# Patient Record
Sex: FEMALE | Race: WHITE | NOT HISPANIC OR LATINO | Employment: FULL TIME | URBAN - METROPOLITAN AREA
[De-identification: names, ages, dates, MRNs, and addresses within clinical notes are randomized per-mention and may not be internally consistent; named-entity substitution may affect disease eponyms.]

---

## 2017-07-18 ENCOUNTER — OFFICE VISIT (OUTPATIENT)
Dept: OBSTETRICS AND GYNECOLOGY | Age: 32
End: 2017-07-18

## 2017-07-18 VITALS
WEIGHT: 213 LBS | BODY MASS INDEX: 34.23 KG/M2 | HEIGHT: 66 IN | SYSTOLIC BLOOD PRESSURE: 124 MMHG | DIASTOLIC BLOOD PRESSURE: 70 MMHG

## 2017-07-18 DIAGNOSIS — Z01.419 ENCOUNTER FOR GYNECOLOGICAL EXAMINATION: Primary | ICD-10-CM

## 2017-07-18 DIAGNOSIS — N92.6 IRREGULAR MENSES: ICD-10-CM

## 2017-07-18 DIAGNOSIS — D25.1 FIBROIDS, INTRAMURAL: ICD-10-CM

## 2017-07-18 PROCEDURE — 99395 PREV VISIT EST AGE 18-39: CPT | Performed by: OBSTETRICS & GYNECOLOGY

## 2017-07-18 NOTE — PROGRESS NOTES
"Subjective     Chief Complaint   Patient presents with   • Gynecologic Exam     Annual:discuss occasional irregular menses         History of Present Illness      Amanda Leonard is a very pleasant  32 y.o. female who presents for annual exam.  Mammo Exam n/a, Contraception n/a, Exercise 5 x wkly.   Overall patient is doing well her cycles have been very regular until this last one and she had a little irregular spotting associated with. No hirsutism no galactorrhea. She is not taking any herbs or supplements. She is restarted to exercise incorporating both resistance and cardiovascular training. She is known to have some fibroids and we have done an ultrasound those in the past. She has a new job and her stress level was actually decreased.      Obstetric History:  OB History     No data available         Menstrual History:     Patient's last menstrual period was 07/16/2017.       Sexual History:       Past Medical History:   Diagnosis Date   • Abdominal discomfort in right lower quadrant     Hx.   • Leiomyoma of uterus      No past surgical history on file.    SOCIAL Hx:      The following portions of the patient's history were reviewed and updated as appropriate: allergies, current medications, past family history, past medical history, past social history, past surgical history and problem list.    Review of Systems        Except as outlined in history of physical illness, patient denies any changes in her GYN, , GI systems.  All other systems reviewed were negative.         Objective   Physical Exam    /70  Ht 66\" (167.6 cm)  Wt 213 lb (96.6 kg)  LMP 07/16/2017  BMI 34.38 kg/m2    General: Patient is alert and oriented and appears overall healthy  Neck: Is supple without thyromegaly, no carotid bruits and no lymphadenopathy  Lungs: Clear bilaterally, no wheezing, rhonchi, or rales.  Respiratory rate is normal  Breast: Even, symmetrical, no lymphadenopathy, no retraction, no masses or cysts  Heart: " Regular rate and rhythm are appreciated, no murmurs or rubs are heard  Abdomen: Is soft, without organomegaly, bowel sounds are positive, there is no                                rebound or guarding and palpation does not produce any discomfort  Back: Nontender without CVA tenderness  Pelvic: External genitalia appear normal and consistent with mature female.  BUS normal                            Vagina is clean dry without discharge and appears adequately estrogenized, no               lesions or masses are present                         Cervix is noninflamed without discharge or lesions.  There is no cervical motion             tenderness.                Uterus is nonenlarged, without tenderness, and no masses or abnormalities are  present Geoffrey fibroids are nonpalpable               Adnexa are non-enlarged, non tender               Rectal exam reveals adequate sphincter tone and no masses or lesions are                     appreciated on digital rectal examination.    There is no problem list on file for this patient.                Assessment/Plan   Amanda was seen today for gynecologic exam.    Diagnoses and all orders for this visit:    Encounter for gynecological examination  -     IGP, Apt HPV,rfx 16 / 18,45  -     Ambulatory Referral to Infertility    Fibroids, intramural  -     Ambulatory Referral to Infertility         Nonpalpable no change since last examination  Irregular menses  -     Ambulatory Referral to Infertility    Given patient's age and no present partner she would like to have a consultation with one of the reproductive endocrinologist to discuss different options including freezing eggs and artificial insemination. We will get that consult going. She will call us back if that has not occurred in the next couple weeks      Annual Well Woman Exam    Discussed today's findings and concerns with patient in detail.  Continue to recommend regular exercise to include cardiovascular and  resistance training and breast self-exam. Wellness lab, mammography, and pap smear, in accordance with age guidelines.  Nutritional and caloric recommendations discussed.    All of the patient's questions were addressed and answered, I have encouraged her to call for today's test results if she has not received them within 10 days.  Patient is advised to call with any change in her condition or with any other questions, otherwise return in 12 months for annual examination.

## 2017-07-21 LAB
CYTOLOGIST CVX/VAG CYTO: NORMAL
CYTOLOGY CVX/VAG DOC THIN PREP: NORMAL
DX ICD CODE: NORMAL
HIV 1 & 2 AB SER-IMP: NORMAL
HPV I/H RISK 4 DNA CVX QL PROBE+SIG AMP: NEGATIVE
OTHER STN SPEC: NORMAL
PATH REPORT.FINAL DX SPEC: NORMAL
STAT OF ADQ CVX/VAG CYTO-IMP: NORMAL

## 2018-04-13 ENCOUNTER — TELEPHONE (OUTPATIENT)
Dept: OBSTETRICS AND GYNECOLOGY | Age: 33
End: 2018-04-13

## 2018-04-13 NOTE — TELEPHONE ENCOUNTER
Dr LAZAR pt, states her last cycle was irreg and she states she is having vaginal pain. Would like an US with this appt?     Pt # [phone]

## 2018-04-17 ENCOUNTER — OFFICE VISIT (OUTPATIENT)
Dept: OBSTETRICS AND GYNECOLOGY | Age: 33
End: 2018-04-17

## 2018-04-17 VITALS
WEIGHT: 204 LBS | DIASTOLIC BLOOD PRESSURE: 70 MMHG | SYSTOLIC BLOOD PRESSURE: 120 MMHG | BODY MASS INDEX: 32.78 KG/M2 | HEIGHT: 66 IN

## 2018-04-17 DIAGNOSIS — N92.6 IRREGULAR MENSES: Primary | ICD-10-CM

## 2018-04-17 DIAGNOSIS — R10.2 VAGINAL PAIN: ICD-10-CM

## 2018-04-17 LAB
B-HCG UR QL: NEGATIVE
INTERNAL NEGATIVE CONTROL: NEGATIVE
INTERNAL POSITIVE CONTROL: POSITIVE
Lab: NORMAL

## 2018-04-17 PROCEDURE — 99214 OFFICE O/P EST MOD 30 MIN: CPT | Performed by: NURSE PRACTITIONER

## 2018-04-17 PROCEDURE — 81025 URINE PREGNANCY TEST: CPT | Performed by: NURSE PRACTITIONER

## 2018-04-17 RX ORDER — FLUTICASONE PROPIONATE 50 MCG
SPRAY, SUSPENSION (ML) NASAL
COMMUNITY
Start: 2018-03-15 | End: 2019-08-12

## 2018-04-17 RX ORDER — CETIRIZINE HYDROCHLORIDE 10 MG/1
10 TABLET ORAL DAILY
COMMUNITY

## 2018-04-17 RX ORDER — SERTRALINE HYDROCHLORIDE 100 MG/1
TABLET, FILM COATED ORAL
Refills: 1 | COMMUNITY
Start: 2018-03-26

## 2018-04-17 NOTE — PROGRESS NOTES
"Subjective   Amanda Leonard is a 33 y.o. female is being seen today for   Chief Complaint   Patient presents with   • Menstrual Problem   • Vaginal Pain   .    History of Present Illness     Patient here to discuss her previous menstrual cycle.  It started 2-3 days earlier than usual and her cycles are usually \"like clockwork\".  It started very light and then lasted the same amount of time as usual but she has never had a cycle start that early.  She also complains of some vaginal pain since that cycle and states her cramping was worse with this cycle.  SHe reports a history of a \"small fibroid\".  She describes the vaginal pain as sharp, shooting pains that come and go throughout the day. It does not seem to be in association with anything.  She is not sexually active. She denies any other vaginal symptoms such as itching, burning or discharge. She hasn't noted any bladder changes.  She did notice some blood in her stool and saw her PCP for that yesterday.  They are just observing that for now.  She is trying to eat healthier but has had no health changes or concerns.        This is a new problem and she is a new patient to me.    The following portions of the patient's history were reviewed and updated as appropriate: allergies, current medications, past family history, past medical history, past social history, past surgical history and problem list.    /70   Ht 167.6 cm (66\")   Wt 92.5 kg (204 lb)   LMP 04/04/2018   BMI 32.93 kg/m²         Review of Systems   Constitutional: Negative.  Negative for activity change, appetite change, fatigue and fever.   HENT: Negative.    Eyes: Negative.    Respiratory: Negative.    Cardiovascular: Negative.    Gastrointestinal: Positive for blood in stool. Negative for abdominal distention, constipation, diarrhea and vomiting.   Endocrine: Negative.    Genitourinary: Positive for menstrual problem and vaginal pain. Negative for vaginal bleeding and vaginal discharge. " "  Musculoskeletal: Negative.    Skin: Negative.    Allergic/Immunologic: Negative.    Neurological: Negative.    Hematological: Negative.    Psychiatric/Behavioral: Negative.        Objective   Physical Exam   Constitutional: She is oriented to person, place, and time. She appears well-developed and well-nourished.   Abdominal: Soft. Bowel sounds are normal. She exhibits no distension and no mass. There is no tenderness. There is no rebound and no guarding.   Genitourinary: Vagina normal and uterus normal. Uterus is not tender. Cervix exhibits no motion tenderness, no discharge and no friability.   Genitourinary Comments: Thin white discharge noted   Neurological: She is alert and oriented to person, place, and time.   Skin: Skin is warm and dry.   Psychiatric: She has a normal mood and affect.         Assessment/Plan   Amanda was seen today for menstrual problem and vaginal pain.    Diagnoses and all orders for this visit:    Irregular menses  -     POC Pregnancy, Urine  -     Urine Culture - Urine, Urine, Clean Catch    Vaginal pain  -     NuSwab BV & Candida - Swab, Vagina        Her exam today is normal, the area of pain that she points to is her bilateral labial area.  There are no lesions, swelling or skin changes.  Her pelvic exam is also totally normal. We discussed her menses and that occasionally there can be an \"odd\" cycle for numerous reasons.  She should continue to monitor her periods and if they remain irregular or bothersome we can do a pelvic ultrasound for evaluation.  Ultimately she could also do some type of birth control to regulate them if they remain irregular.   I see no source of her vaginal pain and will send a vaginal culture and urine culture to rule out any infection.         "

## 2018-04-20 LAB
A VAGINAE DNA VAG QL NAA+PROBE: ABNORMAL SCORE
BACTERIA UR CULT: ABNORMAL
BACTERIA UR CULT: ABNORMAL
BVAB2 DNA VAG QL NAA+PROBE: ABNORMAL SCORE
C ALBICANS DNA VAG QL NAA+PROBE: POSITIVE
C GLABRATA DNA VAG QL NAA+PROBE: NEGATIVE
MEGA1 DNA VAG QL NAA+PROBE: ABNORMAL SCORE
OTHER ANTIBIOTIC SUSC ISLT: ABNORMAL

## 2018-04-24 ENCOUNTER — TELEPHONE (OUTPATIENT)
Dept: OBSTETRICS AND GYNECOLOGY | Age: 33
End: 2018-04-24

## 2018-04-24 RX ORDER — FLUCONAZOLE 150 MG/1
150 TABLET ORAL
Qty: 2 TABLET | Refills: 0 | Status: SHIPPED | OUTPATIENT
Start: 2018-04-24 | End: 2019-08-12

## 2018-04-24 RX ORDER — NITROFURANTOIN 25; 75 MG/1; MG/1
100 CAPSULE ORAL 2 TIMES DAILY
Qty: 14 CAPSULE | Refills: 0 | Status: SHIPPED | OUTPATIENT
Start: 2018-04-24 | End: 2018-05-01

## 2018-04-24 NOTE — TELEPHONE ENCOUNTER
----- Message from CHAGO Hernandez sent at 4/24/2018 10:27 AM EDT -----  Notify + UTI and yeast.  I sent in macrobid and diflucan for her to take.

## 2018-07-31 ENCOUNTER — OFFICE VISIT (OUTPATIENT)
Dept: OBSTETRICS AND GYNECOLOGY | Age: 33
End: 2018-07-31

## 2018-07-31 VITALS
HEIGHT: 65 IN | BODY MASS INDEX: 38.15 KG/M2 | SYSTOLIC BLOOD PRESSURE: 120 MMHG | WEIGHT: 229 LBS | DIASTOLIC BLOOD PRESSURE: 70 MMHG

## 2018-07-31 DIAGNOSIS — R63.8 INCREASED BMI: ICD-10-CM

## 2018-07-31 DIAGNOSIS — Z31.62 ENCOUNTER FOR FERTILITY PRESERVATION COUNSELING: ICD-10-CM

## 2018-07-31 DIAGNOSIS — N30.00 ACUTE CYSTITIS WITHOUT HEMATURIA: ICD-10-CM

## 2018-07-31 DIAGNOSIS — Z01.419 ENCOUNTER FOR GYNECOLOGICAL EXAMINATION: Primary | ICD-10-CM

## 2018-07-31 LAB
BILIRUB BLD-MCNC: NEGATIVE MG/DL
CLARITY, POC: CLEAR
COLOR UR: YELLOW
GLUCOSE UR STRIP-MCNC: NEGATIVE MG/DL
KETONES UR QL: NEGATIVE
LEUKOCYTE EST, POC: NEGATIVE
NITRITE UR-MCNC: NEGATIVE MG/ML
PH UR: 6 [PH] (ref 5–8)
PROT UR STRIP-MCNC: NEGATIVE MG/DL
RBC # UR STRIP: NEGATIVE /UL
SP GR UR: 1.01 (ref 1–1.03)
UROBILINOGEN UR QL: NORMAL

## 2018-07-31 PROCEDURE — 99395 PREV VISIT EST AGE 18-39: CPT | Performed by: OBSTETRICS & GYNECOLOGY

## 2018-07-31 PROCEDURE — 81002 URINALYSIS NONAUTO W/O SCOPE: CPT | Performed by: OBSTETRICS & GYNECOLOGY

## 2018-07-31 RX ORDER — TRIAMCINOLONE ACETONIDE 55 UG/1
2 SPRAY, METERED NASAL DAILY
COMMUNITY
End: 2021-09-09

## 2018-07-31 RX ORDER — CHLORAL HYDRATE 500 MG
CAPSULE ORAL
COMMUNITY
End: 2019-08-12

## 2018-07-31 NOTE — PROGRESS NOTES
"Subjective     Chief Complaint   Patient presents with   • Gynecologic Exam     Annual:last pap 2017,? uti,treated by PCP earlier this year         History of Present Illness      Amanda Leonard is a very pleasant  33 y.o. female who presents for annual exam.  Mammo Exam Age 38, Contraception none, Exercise occasional    Overall patient is doing well she is up-to-date on her wellness labs with PCP. She had a colonoscopy this year for some rectal bleeding that turned out to be associated with a polyp.    She has investigated future reproductive technologies and has declined freezing eggs might consider artificial insemination with donor sperm at a later time.      Obstetric History:  OB History      Para Term  AB Living    0 0 0 0 0 0    SAB TAB Ectopic Molar Multiple Live Births    0 0 0 0 0 0         Menstrual History:     Patient's last menstrual period was 2018 (approximate).       Sexual History:       Past Medical History:   Diagnosis Date   • Abdominal discomfort in right lower quadrant     Hx.   • History of recurrent UTIs    • Leiomyoma of uterus      Past Surgical History:   Procedure Laterality Date   • NO PAST SURGERIES         SOCIAL Hx:      The following portions of the patient's history were reviewed and updated as appropriate: allergies, current medications, past family history, past medical history, past social history, past surgical history and problem list.    Review of Systems        Except as outlined in history of physical illness, patient denies any changes in her GYN, , GI systems.  All other systems reviewed were negative.         Objective   Physical Exam    /70   Ht 165.1 cm (65\")   Wt 104 kg (229 lb)   LMP 2018 (Approximate)   BMI 38.11 kg/m²     General: Patient is alert and oriented and appears overall healthy  Neck: Is supple without thyromegaly, no carotid bruits and no lymphadenopathy  Lungs: Clear bilaterally, no wheezing, rhonchi, or rales.  " Respiratory rate is normal  Breast: Even, symmetrical, no lymphadenopathy, no retraction, no masses or cysts  Heart: Regular rate and rhythm are appreciated, no murmurs or rubs are heard  Abdomen: Is soft, without organomegaly, bowel sounds are positive, there is no                                rebound or guarding and palpation does not produce any discomfort  Back: Nontender without CVA tenderness  Pelvic: External genitalia appear normal and consistent with mature female.  BUS normal                            Vagina is clean dry without discharge and appears adequately estrogenized, no               lesions or masses are present                         Cervix is noninflamed without discharge or lesions.  There is no cervical motion             tenderness.                Uterus is nonenlarged, without tenderness, and no masses or abnormalities are  present, fibroid-nonpalpable               Adnexa are non-enlarged, non tender               Rectal exam reveals adequate sphincter tone and no masses or lesions are                     appreciated on digital rectal examination.    There is no problem list on file for this patient.                Assessment/Plan   Amanda was seen today for gynecologic exam.    Diagnoses and all orders for this visit:    Encounter for gynecological examination  -     IGP, Apt HPV,rfx 16 / 18,45  -     POC Urinalysis Dipstick    Acute cystitis without hematuria  -     POC Urinalysis Dipstick  Today's urinalysis negative  Increased BMI    Encounter for fertility preservation counseling  As outlined above patient might consider artificial insemination with donor sperm at a later stage. She is artery discussed things with several reproductive endocrinologist.      Annual Well Woman Exam    Discussed today's findings and concerns with patient in detail.  Continue to recommend regular exercise to include cardiovascular and resistance training and breast self-exam. Wellness lab, mammography,  and pap smear, in accordance with age guidelines.  Nutritional  recommendations  were discussed.    All of the patient's questions were addressed and answered, I have encouraged her to call for today's test results if she has not received them within 10 days.  Patient is advised to call with any change in her condition or with any other questions, otherwise return in 12 months for annual examination.

## 2018-08-28 ENCOUNTER — APPOINTMENT (OUTPATIENT)
Dept: WOMENS IMAGING | Facility: HOSPITAL | Age: 33
End: 2018-08-28

## 2018-08-28 ENCOUNTER — OFFICE VISIT (OUTPATIENT)
Dept: OBSTETRICS AND GYNECOLOGY | Age: 33
End: 2018-08-28

## 2018-08-28 VITALS
BODY MASS INDEX: 39.99 KG/M2 | WEIGHT: 240 LBS | DIASTOLIC BLOOD PRESSURE: 68 MMHG | SYSTOLIC BLOOD PRESSURE: 126 MMHG | HEIGHT: 65 IN

## 2018-08-28 DIAGNOSIS — N60.01 CYST OF RIGHT BREAST: ICD-10-CM

## 2018-08-28 DIAGNOSIS — R39.9 UTI SYMPTOMS: Primary | ICD-10-CM

## 2018-08-28 LAB
BILIRUB BLD-MCNC: NEGATIVE MG/DL
CLARITY, POC: CLEAR
COLOR UR: YELLOW
GLUCOSE UR STRIP-MCNC: NEGATIVE MG/DL
KETONES UR QL: NEGATIVE
LEUKOCYTE EST, POC: NEGATIVE
NITRITE UR-MCNC: NEGATIVE MG/ML
PH UR: 6.5 [PH] (ref 5–8)
PROT UR STRIP-MCNC: NEGATIVE MG/DL
RBC # UR STRIP: NEGATIVE /UL
SP GR UR: 1.01 (ref 1–1.03)
UROBILINOGEN UR QL: NORMAL

## 2018-08-28 PROCEDURE — 76641 ULTRASOUND BREAST COMPLETE: CPT | Performed by: RADIOLOGY

## 2018-08-28 PROCEDURE — 77066 DX MAMMO INCL CAD BI: CPT | Performed by: RADIOLOGY

## 2018-08-28 PROCEDURE — 81003 URINALYSIS AUTO W/O SCOPE: CPT | Performed by: PHYSICIAN ASSISTANT

## 2018-08-28 PROCEDURE — 99213 OFFICE O/P EST LOW 20 MIN: CPT | Performed by: PHYSICIAN ASSISTANT

## 2018-08-28 RX ORDER — MELATONIN
5000 DAILY
COMMUNITY

## 2018-08-28 RX ORDER — TRIAMCINOLONE ACETONIDE 55 UG/1
2 SPRAY, METERED NASAL DAILY
COMMUNITY
End: 2021-09-09

## 2018-08-28 NOTE — PROGRESS NOTES
"Subjective     Chief Complaint   Patient presents with   • Breast Problem     c/o right breast areola bump that is painful       Amanda Leonard is a 33 y.o.  whose LMP is Patient's last menstrual period was 2018 (exact date). presents with bump on areola  Right areola has a bump that is red and tender  No drainage, no fever  Present for 4 days  It has decreased in size and tenderness  No h/o this  Generally healthy  Just seen for her annual exam      No Additional Complaints Reported    The following portions of the patient's history were reviewed and updated as appropriate:vital signs, allergies, current medications, past family history, past medical history, past social history, past surgical history and problem list      Review of Systems   A comprehensive review of systems was negative except for: Integument/breast: positive for breast lump     Objective      /68   Ht 165.1 cm (65\")   Wt 109 kg (240 lb)   LMP 2018 (Exact Date)   Breastfeeding? No   BMI 39.94 kg/m²     Physical Exam    General:   alert, comfortable and no distress   Heart: Not performed today   Lungs: Not performed today.   Breast: left breast wnl, no masses or skin changes noted. right breast with mild erythema and induration noted on areola at hte 11 o'clock position. non tender and no drainage appreciated   Neck: na   Abdomen: {Not performed today   CVA: Not performed today   Pelvis: Not performed today   Extremities: Not performed today   Neurologic: negative   Psychiatric: Normal affect, judgement, and mood       Lab Review   Labs: No data reviewed     Imaging   No data reviewed    Assessment/Plan     ASSESSMENT  1. UTI symptoms    2. Cyst of right breast        PLAN  1.   Orders Placed This Encounter   Procedures   • Mammo Diagnostic Bilateral With CAD   • US breast right limited   • POC Urinalysis Dipstick, Multipro       2. Possible resolving abscess.  Plan imaging and warm compresses.  It is resolving so at " "this point antibiotics not indicated but discussed that if it should \"flare up\" that I would start one for her.  If imaging is wnl and redness and induration resolves will c/w monitoring only.  Pt agreeable with poc and all questions were answered    Follow up: 1 year(s)    DION Haywood  8/28/2018           "

## 2019-08-12 ENCOUNTER — OFFICE VISIT (OUTPATIENT)
Dept: OBSTETRICS AND GYNECOLOGY | Age: 34
End: 2019-08-12

## 2019-08-12 VITALS
WEIGHT: 257 LBS | HEIGHT: 65 IN | SYSTOLIC BLOOD PRESSURE: 120 MMHG | DIASTOLIC BLOOD PRESSURE: 78 MMHG | BODY MASS INDEX: 42.82 KG/M2

## 2019-08-12 DIAGNOSIS — Z01.419 ENCOUNTER FOR GYNECOLOGICAL EXAMINATION: Primary | ICD-10-CM

## 2019-08-12 PROCEDURE — 99395 PREV VISIT EST AGE 18-39: CPT | Performed by: OBSTETRICS & GYNECOLOGY

## 2019-08-12 RX ORDER — CELECOXIB 400 MG/1
CAPSULE ORAL
COMMUNITY
Start: 2019-07-10 | End: 2021-09-09

## 2019-08-12 RX ORDER — ATORVASTATIN CALCIUM 10 MG/1
TABLET, FILM COATED ORAL
COMMUNITY
Start: 2019-07-27

## 2019-08-12 RX ORDER — MONTELUKAST SODIUM 10 MG/1
10 TABLET ORAL DAILY
Refills: 5 | COMMUNITY
Start: 2019-05-16 | End: 2021-09-09

## 2019-08-12 NOTE — PROGRESS NOTES
"Subjective     Chief Complaint   Patient presents with   • Gynecologic Exam     Annual:last pap 2018,mammo 2018         History of Present Illness      Amanda Leonard is a very pleasant  34 y.o. female who presents for annual exam.  Mammo Exam 2018, Contraception none not sexually active, Exercise 4 times a week    Patient cycles have gotten a little more regular and a little less heavy.  She has no gynecological concerns or complaints.  We have previously talked about whether or not she wants to see a reproductive endocrinologist for potential artificial insemination with donor sperm.  She does not want to do that.  She is up-to-date on her wellness labs and had a mammogram last year.      Obstetric History:  OB History      Para Term  AB Living    0 0 0 0 0 0    SAB TAB Ectopic Molar Multiple Live Births    0 0 0 0 0 0         Menstrual History:     Patient's last menstrual period was 2019 (exact date).       Sexual History:       Past Medical History:   Diagnosis Date   • Abdominal discomfort in right lower quadrant     Hx.   • History of recurrent UTIs    • Leiomyoma of uterus      Past Surgical History:   Procedure Laterality Date   • NO PAST SURGERIES         SOCIAL Hx:      The following portions of the patient's history were reviewed and updated as appropriate: allergies, current medications, past family history, past medical history, past social history, past surgical history and problem list.    Review of Systems        Except as outlined in history of physical illness, patient denies any changes in her GYN, , GI systems.  All other systems reviewed were negative.         Objective   Physical Exam    /78   Ht 165.1 cm (65\")   Wt 117 kg (257 lb)   LMP 2019 (Exact Date)   BMI 42.77 kg/m²     General: Patient is alert and oriented and appears overall healthy  Neck: Is supple without thyromegaly, no carotid bruits and no lymphadenopathy  Lungs: Clear bilaterally, no " wheezing, rhonchi, or rales.  Respiratory rate is normal  Breast: Even, symmetrical, no lymphadenopathy, no retraction, no masses or cysts  Heart: Regular rate and rhythm are appreciated, no murmurs or rubs are heard  Abdomen: Is soft, without organomegaly, bowel sounds are positive, there is no                                rebound or guarding and palpation does not produce any discomfort  Back: Nontender without CVA tenderness  Pelvic: External genitalia appear normal and consistent with mature female.  BUS normal                            Vagina is clean dry without discharge and appears adequately estrogenized, no               lesions or masses are present                         Cervix is noninflamed without discharge or lesions.  There is no cervical motion             tenderness.                Uterus is nonenlarged, without tenderness, and no masses or abnormalities are  present, I was unable to appreciate any uterine leiomyoma               Adnexa are non-enlarged, non tender               Rectal exam reveals adequate sphincter tone and no masses or lesions are                     appreciated on digital rectal examination.      Annual Well Woman Exam  There is no problem list on file for this patient.                Assessment/Plan   Amanda was seen today for gynecologic exam.    Diagnoses and all orders for this visit:    Encounter for gynecological examination  -     IGP, Apt HPV,rfx 16 / 18,45    We will continue her Zoloft and that is being prescribed by her psychiatrist        Discussed today's findings and concerns with patient.  Continue to recommend regular exercise including cardiovascular and resistance training as well as  breast self-exam. Wellness lab, mammography, & pap smear, in accordance with age guidelines.        All of the patient's questions were addressed and answered, I have encouraged her to call for today's test results if she has not received them within 10 days.  Patient is  advised to call with any change in her condition or with any other questions, otherwise return in 12 months for annual examination.

## 2019-08-15 LAB
CYTOLOGIST CVX/VAG CYTO: NORMAL
CYTOLOGY CVX/VAG DOC CYTO: NORMAL
CYTOLOGY CVX/VAG DOC THIN PREP: NORMAL
DX ICD CODE: NORMAL
HIV 1 & 2 AB SER-IMP: NORMAL
HPV I/H RISK 4 DNA CVX QL PROBE+SIG AMP: NEGATIVE
Lab: NORMAL
OTHER STN SPEC: NORMAL
STAT OF ADQ CVX/VAG CYTO-IMP: NORMAL

## 2020-08-17 ENCOUNTER — OFFICE VISIT (OUTPATIENT)
Dept: OBSTETRICS AND GYNECOLOGY | Age: 35
End: 2020-08-17

## 2020-08-17 VITALS
WEIGHT: 238.6 LBS | DIASTOLIC BLOOD PRESSURE: 68 MMHG | SYSTOLIC BLOOD PRESSURE: 124 MMHG | BODY MASS INDEX: 39.75 KG/M2 | HEIGHT: 65 IN

## 2020-08-17 DIAGNOSIS — Z00.00 ENCOUNTER FOR ANNUAL PHYSICAL EXAM: ICD-10-CM

## 2020-08-17 DIAGNOSIS — Z01.419 ENCOUNTER FOR GYNECOLOGICAL EXAMINATION: ICD-10-CM

## 2020-08-17 DIAGNOSIS — Z31.69 PRE-CONCEPTION COUNSELING: Primary | ICD-10-CM

## 2020-08-17 PROCEDURE — 99395 PREV VISIT EST AGE 18-39: CPT | Performed by: OBSTETRICS & GYNECOLOGY

## 2020-08-17 NOTE — PROGRESS NOTES
"Subjective     Chief Complaint   Patient presents with   • Gynecologic Exam     AE today, Pap , Mg , No problems         History of Present Illness      Amanda Leonard is a very pleasant  35 y.o. female who presents for annual exam.  Mammo Exam , Contraception none not active, Exercise high intensity interval training    Patient has seen an infertility specialist and is considering moving forward on that equation    Patient has no other gynecological concerns or complaints.  She is known to have a uterine fibroid..      Obstetric History:  OB History        0    Para   0    Term   0       0    AB   0    Living   0       SAB   0    TAB   0    Ectopic   0    Molar   0    Multiple   0    Live Births   0               Menstrual History:     Patient's last menstrual period was 2020 (exact date).       Sexual History:       Past Medical History:   Diagnosis Date   • Abdominal discomfort in right lower quadrant     Hx.   • History of recurrent UTIs    • Leiomyoma of uterus      Past Surgical History:   Procedure Laterality Date   • NO PAST SURGERIES         SOCIAL Hx:      The following portions of the patient's history were reviewed and updated as appropriate: allergies, current medications, past family history, past medical history, past social history, past surgical history and problem list.    Review of Systems        Except as outlined in history of physical illness, patient denies any changes in her GYN, , GI systems.  All other systems reviewed were negative.         Objective   Physical Exam    /68   Ht 165.1 cm (65\")   Wt 108 kg (238 lb 9.6 oz)   LMP 2020 (Exact Date)   Breastfeeding No   BMI 39.71 kg/m²     General: Patient is alert and oriented and appears overall healthy  Neck: Is supple without thyromegaly, no carotid bruits and no lymphadenopathy  Lungs: Clear bilaterally, no wheezing, rhonchi, or rales.  Respiratory rate is normal  Breast: Even, symmetrical, " no lymphadenopathy, no retraction, no masses or cysts  Heart: Regular rate and rhythm are appreciated, no murmurs or rubs are heard  Abdomen: Is soft, without organomegaly, bowel sounds are positive, there is no                                rebound or guarding and palpation does not produce any discomfort  Back: Nontender without CVA tenderness  Pelvic: External genitalia appear normal and consistent with mature female.  BUS normal                            Vagina is clean dry without discharge and appears adequately estrogenized, no               lesions or masses are present                         Cervix is noninflamed without discharge or lesions.  There is no cervical motion             tenderness.                Uterus is nonenlarged, without tenderness, and no masses or abnormalities are  present               Adnexa are non-enlarged, non tender               Rectal exam reveals adequate sphincter tone and no masses or lesions are                     appreciated on digital rectal examination.      Annual Well Woman Exam  There is no problem list on file for this patient.                Assessment/Plan   Amanda was seen today for gynecologic exam.    Diagnoses and all orders for this visit:    Pre-conception counseling    Encounter for annual physical exam    Will follow back up with infertility specialist, in the meantime start taking daily doses of folic acid as we had discussed previously, if she were to get pregnant would recommend coming off her statin and Zoloft.      Discussed today's findings and concerns with patient.  Continue to recommend regular exercise including cardiovascular and resistance training as well as  breast self-exam. Wellness lab, mammography, & pap smear, in accordance with age guidelines.    I have encouraged her to call for today's test results if she has not received them within 10 days.  Patient is advised to call with any change in her condition or with any other questions,  otherwise return  for annual examination.

## 2020-08-19 LAB
CYTOLOGIST CVX/VAG CYTO: NORMAL
CYTOLOGY CVX/VAG DOC CYTO: NORMAL
CYTOLOGY CVX/VAG DOC THIN PREP: NORMAL
DX ICD CODE: NORMAL
HIV 1 & 2 AB SER-IMP: NORMAL
HPV I/H RISK 4 DNA CVX QL PROBE+SIG AMP: NEGATIVE
OTHER STN SPEC: NORMAL
STAT OF ADQ CVX/VAG CYTO-IMP: NORMAL

## 2020-10-13 ENCOUNTER — TELEPHONE (OUTPATIENT)
Dept: OBSTETRICS AND GYNECOLOGY | Age: 35
End: 2020-10-13

## 2020-10-13 RX ORDER — PNV NO.95/FERROUS FUM/FOLIC AC 28MG-0.8MG
1 TABLET ORAL DAILY
Qty: 30 TABLET | Refills: 11 | Status: SHIPPED | OUTPATIENT
Start: 2020-10-13

## 2020-10-13 NOTE — TELEPHONE ENCOUNTER
(Link Pt)       Pt called and wanting to know if a prenatal vitamin could be called into the pharmacy. Pt has looked at over the counter and was a little expensive.     Please advise     Pharmacy verified     [phone]

## 2020-10-29 ENCOUNTER — TELEPHONE (OUTPATIENT)
Dept: OBSTETRICS AND GYNECOLOGY | Age: 35
End: 2020-10-29

## 2020-10-29 NOTE — TELEPHONE ENCOUNTER
Patient called, stated that  referred her to a fertility specialist () patient would like know if there was any other doctors or practices that  would recommend?     Patient stated that she wasn't happy when she saw him, but if  feels as though he's the best option then she will continue to see .    Patient is aware that  is out of office today, and will respond at his earliest convenience.

## 2021-04-16 ENCOUNTER — BULK ORDERING (OUTPATIENT)
Dept: CASE MANAGEMENT | Facility: OTHER | Age: 36
End: 2021-04-16

## 2021-04-16 DIAGNOSIS — Z23 IMMUNIZATION DUE: ICD-10-CM

## 2021-09-09 ENCOUNTER — OFFICE VISIT (OUTPATIENT)
Dept: OBSTETRICS AND GYNECOLOGY | Age: 36
End: 2021-09-09

## 2021-09-09 VITALS
BODY MASS INDEX: 46.65 KG/M2 | SYSTOLIC BLOOD PRESSURE: 122 MMHG | HEIGHT: 65 IN | WEIGHT: 280 LBS | DIASTOLIC BLOOD PRESSURE: 72 MMHG

## 2021-09-09 DIAGNOSIS — Z12.4 ENCOUNTER FOR PAPANICOLAOU SMEAR FOR CERVICAL CANCER SCREENING: ICD-10-CM

## 2021-09-09 DIAGNOSIS — Z01.419 WELL WOMAN EXAM WITH ROUTINE GYNECOLOGICAL EXAM: Primary | ICD-10-CM

## 2021-09-09 DIAGNOSIS — Z11.51 SCREENING FOR HPV (HUMAN PAPILLOMAVIRUS): ICD-10-CM

## 2021-09-09 PROCEDURE — 99395 PREV VISIT EST AGE 18-39: CPT | Performed by: PHYSICIAN ASSISTANT

## 2021-09-09 RX ORDER — UBIDECARENONE 50 MG
CAPSULE ORAL DAILY
COMMUNITY

## 2021-09-09 NOTE — PROGRESS NOTES
"Subjective     Chief Complaint   Patient presents with   • Gynecologic Exam     annual exam last pap 2020 neg/ neg       History of Present Illness    Amanda Leonard is a 36 y.o.  who presents for annual exam.    She is doing ok  Staying healthy    She is attempting IVF with a sperm donor  [facility]    Is working on weight loss  Diagnosed with an eating disorder and trying to work on that with a therapist   Also on zoloft    Her menses are regular every 28-30 days, lasting 4-7 days, dysmenorrhea none   Obstetric History:  OB History        0    Para   0    Term   0       0    AB   0    Living   0       SAB   0    TAB   0    Ectopic   0    Molar   0    Multiple   0    Live Births   0               Menstrual History:     Patient's last menstrual period was 2021 (exact date).         Current contraception: none  History of abnormal Pap smear: no  Received Gardasil immunization: no  Perform regular self breast exam: yes - occl  Family history of uterine or ovarian cancer: no  Family History of colon cancer: no  Family history of breast cancer: no    Mammogram: not indicated.  Colonoscopy: not indicated.  DEXA: not indicated.    Exercise: not active  Calcium/Vitamin D: adequate intake    The following portions of the patient's history were reviewed and updated as appropriate: allergies, current medications, past family history, past medical history, past social history, past surgical history and problem list.    Review of Systems   All other systems reviewed and are negative.      Review of Systems   Constitutional: Negative for fatigue.   Respiratory: Negative for shortness of breath.    Gastrointestinal: Negative for abdominal pain.   Genitourinary: Negative for dysuria.   Neurological: Negative for headaches.   Psychiatric/Behavioral: Negative for dysphoric mood.         Objective   Physical Exam    /72   Ht 165.1 cm (65\")   Wt 127 kg (280 lb)   LMP 2021 (Exact " Date)   Breastfeeding No   BMI 46.59 kg/m²   General:   alert, comfortable and no distress   Heart: regular rate and rhythm   Lungs: clear to auscultation bilaterally   Breast: normal appearance, no masses or tenderness, Inspection negative, No nipple retraction or dimpling, No nipple discharge or bleeding, No axillary or supraclavicular adenopathy, Normal to palpation without dominant masses   Neck: no adenopathy and no carotid bruit   Abdomen: {normal findings: soft, non-tender   CVA: Not performed today   Pelvis: External genitalia: normal general appearance  Vaginal: normal mucosa without prolapse or lesions  Cervix: normal appearance and thin prep PAP obtained  Adnexa: normal bimanual exam  Uterus: normal single, nontender  Exam limited by body habitus   Extremities: Not performed today   Neurologic: negative   Psychiatric: Normal affect, judgement, and mood     Assessment/Plan   Diagnoses and all orders for this visit:    1. Well woman exam with routine gynecological exam (Primary)    2. Encounter for Papanicolaou smear for cervical cancer screening  -     IGP, Aptima HPV, Rfx 16 / 18,45    3. Screening for HPV (human papillomavirus)  -     IGP, Aptima HPV, Rfx 16 / 18,45        All questions answered.  Breast self exam technique reviewed and patient encouraged to perform self-exam monthly.  Discussed healthy lifestyle modifications.  Recommended 30 minutes of aerobic exercise five times per week.  Discussed calcium needs to prevent osteoporosis.      Pap done at pt's request  Enc to c/w diet changes and exercise  Gave HO on zoloft use in pregnancy and gave pt HO as well, she has discussed this with her therapist and they want her to c/w it at this time

## 2022-09-26 ENCOUNTER — OFFICE VISIT (OUTPATIENT)
Dept: OBSTETRICS AND GYNECOLOGY | Age: 37
End: 2022-09-26

## 2022-09-26 VITALS
HEIGHT: 65 IN | DIASTOLIC BLOOD PRESSURE: 68 MMHG | WEIGHT: 293 LBS | BODY MASS INDEX: 48.82 KG/M2 | SYSTOLIC BLOOD PRESSURE: 120 MMHG

## 2022-09-26 DIAGNOSIS — Z01.419 ENCOUNTER FOR GYNECOLOGICAL EXAMINATION: Primary | ICD-10-CM

## 2022-09-26 PROCEDURE — 99395 PREV VISIT EST AGE 18-39: CPT | Performed by: NURSE PRACTITIONER

## 2022-09-26 NOTE — PROGRESS NOTES
Subjective       History of Present Illness    Chief Complaint   Patient presents with   • Gynecologic Exam     AE Today, Last AE 2021 (-), HPV (-)       Amanda Leonard is a 37 y.o. female who presents for annual exam.  Patient of   Here for AE  No problems or concerns  Not sexually active  Cycles are regular and last 3-4 days  No bowel or bladder problems    OB History    Para Term  AB Living   0 0 0 0 0 0   SAB IAB Ectopic Molar Multiple Live Births   0 0 0 0 0 0       The following portions of the patient's history were reviewed and updated as appropriate: allergies, current medications, past family history, past medical history, past social history, past surgical history and problem list.    Her menses are regular every 28-30 days, lasting 4-7 days.         Current contraception: none  History of abnormal Pap smear: no  Received Gardasil immunization: no  Perform regular self breast exam: yes - occl  Family history of uterine or ovarian cancer: no  Family History of colon cancer: no  Family history of breast cancer: no     Mammogram: not indicated.  Colonoscopy: not indicated.  DEXA: not indicated.     Exercise: not active  Calcium/Vitamin D: adequate intake    The following portions of the patient's history were reviewed and updated as appropriate: allergies, current medications, past family history, past medical history, past social history, past surgical history and problem list.    Review of Systems   Constitutional: Negative.    HENT: Negative.    Eyes: Negative.    Respiratory: Negative.    Cardiovascular: Negative.    Gastrointestinal: Negative.    Endocrine: Negative.    Genitourinary: Negative.    Musculoskeletal: Negative.    Skin: Negative.    Allergic/Immunologic: Negative.    Neurological: Negative.    Hematological: Negative.    Psychiatric/Behavioral: Negative.          Objective   Physical Exam  Vitals reviewed.   Constitutional:       Appearance: She is well-developed.  "  Neck:      Thyroid: No thyroid mass.   Cardiovascular:      Rate and Rhythm: Normal rate and regular rhythm.      Heart sounds: Normal heart sounds.   Pulmonary:      Effort: Pulmonary effort is normal.      Breath sounds: Normal breath sounds.   Chest:   Breasts:      Right: No mass, nipple discharge, skin change or tenderness.      Left: No mass, nipple discharge, skin change or tenderness.       Abdominal:      Palpations: Abdomen is soft.      Tenderness: There is no abdominal tenderness.   Genitourinary:     Labia:         Right: No rash or lesion.         Left: No rash or lesion.       Vagina: Normal.      Cervix: No cervical motion tenderness, discharge or friability.      Adnexa:         Right: No mass or tenderness.          Left: No mass or tenderness.     Neurological:      Mental Status: She is alert and oriented to person, place, and time.   Psychiatric:         Behavior: Behavior normal.         /68   Ht 165.1 cm (65\")   Wt (!) 140 kg (308 lb)   LMP 09/08/2022 (Approximate)   Breastfeeding No   BMI 51.25 kg/m²     Assessment & Plan   Diagnoses and all orders for this visit:    1. Encounter for gynecological examination (Primary)  -     IGP,rfx Aptima HPV All Pth; Future          Breast self exam technique reviewed and patient encouraged to perform self-exam monthly.  Discussed healthy lifestyle modifications.  Pap smear done with reflex hpv  Recommended 30 minutes of aerobic exercise five times per week.  Discussed calcium needs to prevent osteoporosis         "

## 2022-09-30 LAB
CONV .: NORMAL
CYTOLOGIST CVX/VAG CYTO: NORMAL
CYTOLOGY CVX/VAG DOC CYTO: NORMAL
CYTOLOGY CVX/VAG DOC THIN PREP: NORMAL
DX ICD CODE: NORMAL
HIV 1 & 2 AB SER-IMP: NORMAL
OTHER STN SPEC: NORMAL
STAT OF ADQ CVX/VAG CYTO-IMP: NORMAL

## 2023-08-04 DIAGNOSIS — Z12.31 ENCOUNTER FOR SCREENING MAMMOGRAM FOR MALIGNANT NEOPLASM OF BREAST: Primary | ICD-10-CM

## 2023-09-28 ENCOUNTER — OFFICE VISIT (OUTPATIENT)
Dept: OBSTETRICS AND GYNECOLOGY | Age: 38
End: 2023-09-28
Payer: COMMERCIAL

## 2023-09-28 VITALS
DIASTOLIC BLOOD PRESSURE: 70 MMHG | HEIGHT: 65 IN | SYSTOLIC BLOOD PRESSURE: 122 MMHG | WEIGHT: 293 LBS | BODY MASS INDEX: 48.82 KG/M2

## 2023-09-28 DIAGNOSIS — E03.9 HYPOTHYROIDISM (ACQUIRED): ICD-10-CM

## 2023-09-28 DIAGNOSIS — Z11.51 SCREENING FOR HPV (HUMAN PAPILLOMAVIRUS): ICD-10-CM

## 2023-09-28 DIAGNOSIS — Z01.419 WELL WOMAN EXAM WITH ROUTINE GYNECOLOGICAL EXAM: Primary | ICD-10-CM

## 2023-09-28 DIAGNOSIS — Z12.4 ENCOUNTER FOR PAPANICOLAOU SMEAR FOR CERVICAL CANCER SCREENING: ICD-10-CM

## 2023-09-28 LAB — TSH SERPL DL<=0.005 MIU/L-ACNC: 1.56 UIU/ML (ref 0.27–4.2)

## 2023-09-28 RX ORDER — LEVOTHYROXINE SODIUM 0.03 MG/1
25 TABLET ORAL
COMMUNITY

## 2023-09-28 NOTE — PROGRESS NOTES
"Subjective     Chief Complaint   Patient presents with    Gynecologic Exam     Patient is here for her annual. Last pap 2022 normal. M/g scheduled 2023.   Patient complains about bumps on the inside her inner lips.        History of Present Illness    Amanda Leonard is a 38 y.o.  who presents for annual exam.    She is doing well    Is reading about genetic testing  Considering getting it done  Possible family h/o uterine cancer?  Will check with her mom  Disc that she could do cash pay option regardless of family history  She will consider this    Is looking at getting pregnant  Using sperm donor  Did a transfer x 1 and had a miscarriage  It was early on in the pregnancy   Is planning to do it again but has to get ready physically, financially and emotionally  Has done genetic testing with the reproductive specialist through [facility]  Unsure what they tested for but unlikely to be genetic mutations for cancer causing genes    Is on a thyroid medication now per [facility]  Has not had labs checked in a while  Will do so today    Notes a \"bump\" at the opening to her vagina  Comes and goes    Never been SA so not at risk for STD    Mammogram scheduled      Her menses are irregular, lasting 4-7 days, dysmenorrhea none   Obstetric History:  OB History          1    Para   0    Term   0       0    AB   1    Living   0         SAB   1    IAB   0    Ectopic   0    Molar   0    Multiple   0    Live Births   0               Menstrual History:     Patient's last menstrual period was 2023 (exact date).         Current contraception: abstinence  History of abnormal Pap smear: no  Received Gardasil immunization: no  Perform regular self breast exam: yes - occl  Family history of uterine or ovarian cancer: yes - possibly MGM  Family History of colon cancer: no  Family history of breast cancer: no    Mammogram: ordered.  Colonoscopy: not indicated.  DEXA: not indicated.    Exercise: not " "active  Calcium/Vitamin D: adequate intake    The following portions of the patient's history were reviewed and updated as appropriate: allergies, current medications, past family history, past medical history, past social history, past surgical history, and problem list.    Review of Systems   Genitourinary:         Vaginal bumps   All other systems reviewed and are negative.    Review of Systems   Constitutional: Negative for fatigue.   Respiratory: Negative for shortness of breath.    Gastrointestinal: Negative for abdominal pain.   Genitourinary: Negative for dysuria.   Neurological: Negative for headaches.   Psychiatric/Behavioral: Negative for dysphoric mood.         Objective   Physical Exam  Genitourinary:         Comments: Palpable nodules/?lymph node x 1. Non tender.       /70   Ht 165.1 cm (65\")   Wt (!) 139 kg (307 lb)   LMP 09/09/2023 (Exact Date)   BMI 51.09 kg/m²   General:   alert, comfortable, and no distress   Heart: regular rate and rhythm   Lungs: clear to auscultation bilaterally   Breast: normal appearance, no masses or tenderness, Inspection negative, No nipple retraction or dimpling, No nipple discharge or bleeding, No axillary or supraclavicular adenopathy, Normal to palpation without dominant masses   Neck: no adenopathy and no carotid bruit   Abdomen: normal findings: soft, non-tender   CVA: Not performed today   Pelvis: External genitalia: normal general appearance  Vaginal: normal mucosa without prolapse or lesions  Cervix: normal appearance and thin prep PAP obtained  Adnexa: normal bimanual exam and non palpable  Uterus: normal single, nontender  Exam limited d/t habitus   Extremities: Not performed today   Neurologic: negative   Psychiatric: Normal affect, judgement, and mood     Assessment & Plan   Diagnoses and all orders for this visit:    1. Well woman exam with routine gynecological exam (Primary)    2. Hypothyroidism (acquired)  -     TSH    3. Encounter for " Papanicolaou smear for cervical cancer screening  -     IGP, Aptima HPV, Rfx 16 / 18,45    4. Screening for HPV (human papillomavirus)  -     IGP, Aptima HPV, Rfx 16 / 18,45        All questions answered.  Breast self exam technique reviewed and patient encouraged to perform self-exam monthly.  Discussed healthy lifestyle modifications.  Recommended 30 minutes of aerobic exercise five times per week.  Discussed calcium needs to prevent osteoporosis.    Pap done at pt request  Check TSH level, notes hot flashes, unclear whether SE of hormones or thyroid  Mammogram scheduled  HO on genetic testing given

## 2023-10-02 LAB
CYTOLOGIST CVX/VAG CYTO: NORMAL
CYTOLOGY CVX/VAG DOC CYTO: NORMAL
CYTOLOGY CVX/VAG DOC THIN PREP: NORMAL
DX ICD CODE: NORMAL
HIV 1 & 2 AB SER-IMP: NORMAL
HPV GENOTYPE REFLEX: NORMAL
HPV I/H RISK 4 DNA CVX QL PROBE+SIG AMP: NEGATIVE
OTHER STN SPEC: NORMAL
STAT OF ADQ CVX/VAG CYTO-IMP: NORMAL

## 2023-11-07 ENCOUNTER — HOSPITAL ENCOUNTER (OUTPATIENT)
Facility: HOSPITAL | Age: 38
Discharge: HOME | End: 2023-11-07
Admitting: OBSTETRICS & GYNECOLOGY
Payer: COMMERCIAL

## 2023-11-07 DIAGNOSIS — Z12.31 ENCOUNTER FOR SCREENING MAMMOGRAM FOR MALIGNANT NEOPLASM OF BREAST: ICD-10-CM

## 2023-11-07 PROCEDURE — 77067 SCR MAMMO BI INCL CAD: CPT

## 2023-11-07 PROCEDURE — 77063 BREAST TOMOSYNTHESIS BI: CPT

## 2024-08-12 ENCOUNTER — TELEPHONE (OUTPATIENT)
Dept: OBSTETRICS AND GYNECOLOGY | Age: 39
End: 2024-08-12

## 2024-08-12 DIAGNOSIS — Z12.31 VISIT FOR SCREENING MAMMOGRAM: Primary | ICD-10-CM

## 2024-08-12 NOTE — TELEPHONE ENCOUNTER
Caller: Amanda Leonard    Relationship: Self    Best call back number: [phone]      Who are you requesting to speak with (clinical staff,  LINK      What was the call regarding: PATIENT ADVISED THAT SHE WOULD LIKE TO SCHEDULE HER MAMMOGRAM, PLEASE ASSIST.

## 2024-08-12 NOTE — TELEPHONE ENCOUNTER
Caller: PB LOYA    Relationship to patient: SELF    Best call back number: [phone]        Type of visit: MAMMO PHILIPPE WITH UPCOMING ANNUAL APPT 10-1-24    Requested date: 08-12-24       Additional notes:PT CALLED TO CONFIRM HER ANNUAL APPT DATE AND TIME AND WANTED TO HAVE A MAMMOGRAM PERFORMED AS WELL ON 10-1-24

## 2024-10-01 ENCOUNTER — HOSPITAL ENCOUNTER (OUTPATIENT)
Facility: HOSPITAL | Age: 39
Discharge: HOME | End: 2024-10-01
Admitting: OBSTETRICS & GYNECOLOGY
Payer: COMMERCIAL

## 2024-10-01 ENCOUNTER — OFFICE VISIT (OUTPATIENT)
Dept: OBSTETRICS AND GYNECOLOGY | Age: 39
End: 2024-10-01
Payer: COMMERCIAL

## 2024-10-01 VITALS
HEIGHT: 65 IN | BODY MASS INDEX: 48.82 KG/M2 | WEIGHT: 293 LBS | SYSTOLIC BLOOD PRESSURE: 128 MMHG | DIASTOLIC BLOOD PRESSURE: 74 MMHG

## 2024-10-01 DIAGNOSIS — N92.6 IRREGULAR MENSES: ICD-10-CM

## 2024-10-01 DIAGNOSIS — Z12.31 VISIT FOR SCREENING MAMMOGRAM: ICD-10-CM

## 2024-10-01 DIAGNOSIS — Z31.9 INFERTILITY MANAGEMENT: ICD-10-CM

## 2024-10-01 DIAGNOSIS — Z01.419 ENCOUNTER FOR GYNECOLOGICAL EXAMINATION: Primary | ICD-10-CM

## 2024-10-01 DIAGNOSIS — E03.9 HYPOTHYROIDISM (ACQUIRED): ICD-10-CM

## 2024-10-01 DIAGNOSIS — Z12.4 SCREENING FOR CERVICAL CANCER: ICD-10-CM

## 2024-10-01 DIAGNOSIS — R63.8 INCREASED BMI: ICD-10-CM

## 2024-10-01 DIAGNOSIS — Z12.31 BREAST CANCER SCREENING BY MAMMOGRAM: ICD-10-CM

## 2024-10-01 PROBLEM — D25.1 INTRAMURAL LEIOMYOMA OF UTERUS: Status: ACTIVE | Noted: 2024-10-01

## 2024-10-01 PROCEDURE — 77067 SCR MAMMO BI INCL CAD: CPT

## 2024-10-01 PROCEDURE — 77063 BREAST TOMOSYNTHESIS BI: CPT

## 2024-10-01 NOTE — PROGRESS NOTES
Subjective     Chief Complaint   Patient presents with    Gynecologic Exam     Annual:last pap ,mammo ,Discuss irregular mesnes,heavier flow         History of Present Illness    Wellness exam  Amanda Leonard is a very pleasant  39 y.o. female who presents for annual exam.  Mammo Exam 2023 subsequent mammogram order has been placed, Contraception none history of infertility and habitual miscarriage, Exercise 4 times a week walking  Amanda is here for annual exam but she has several concerns she wants to talk about  These include her decreased fertility, increased body mass index, thyroid dysfunction, pregnancy concerns if she were to attempt another rounds of IVF    Amanda has had a normal hysterosalpingogram, history of recurrent SABs, has seen reproductive endocrinology with the reproductive [place].  She has had 2 rounds of IVF 1 of which resulted in a miscarriage the other 1 did not take.  They have been genetically pretested.    Her other medical issues are outlined..      Obstetric History:  OB History          1    Para   0    Term   0       0    AB   1    Living   0         SAB   1    IAB   0    Ectopic   0    Molar   0    Multiple   0    Live Births   0               Menstrual History:     Patient's last menstrual period was 2024 (exact date).       Sexual History:       Past Medical History:   Diagnosis Date    Abdominal discomfort in right lower quadrant     Hx.    History of recurrent UTIs     Hypothyroidism (acquired) 10/01/2024    Leiomyoma of uterus      Past Surgical History:   Procedure Laterality Date    NO PAST SURGERIES         SOCIAL Hx:      The following portions of the patient's history were reviewed and updated as appropriate: allergies, current medications, past family history, past medical history, past social history, past surgical history and problem list.    Review of Systems        Except as outlined in history of physical illness, patient denies  "any changes in her GYN, , GI systems.  All other systems reviewed were negative.         Current Outpatient Medications:     atorvastatin (LIPITOR) 10 MG tablet, , Disp: , Rfl:     Calcium Carbonate-Vitamin D 500-125 MG-UNIT tablet, Take  by mouth., Disp: , Rfl:     coenzyme Q10 50 MG capsule capsule, Take  by mouth Daily., Disp: , Rfl:     levothyroxine (SYNTHROID, LEVOTHROID) 25 MCG tablet, Take 1 tablet by mouth Every Morning. She takes 1.5 daily., Disp: , Rfl:     Omega-3 Fatty Acids (OMEGA 3 500 PO), Take  by mouth., Disp: , Rfl:     Prenatal Vit-Fe Fumarate-FA (Prenatal Vitamin) 27-0.8 MG tablet, Take 1 capsule by mouth Daily., Disp: 30 tablet, Rfl: 11    sertraline (ZOLOFT) 50 MG tablet, Take 3 tablets by mouth Daily., Disp: , Rfl: 1   Objective   Physical Exam    /74   Ht 165.1 cm (65\")   Wt 136 kg (300 lb)   LMP 09/21/2024 (Exact Date)   BMI 49.92 kg/m²     General: Patient is alert and oriented and appears overall healthy  Neck: Is supple without thyromegaly, no carotid bruits and no lymphadenopathy  Lungs: Clear bilaterally, no wheezing, rhonchi, or rales.  Respiratory rate is normal  Breast: Even, symmetrical, no lymphadenopathy, no retraction, no discharge, no masses or cysts  Heart: Regular rate and rhythm are appreciated, no murmurs or rubs are heard  Abdomen: Is soft, without organomegaly, bowel sounds are positive, there is no rebound or guarding and palpation does not produce any discomfort  Back: Nontender without CVA tenderness  Pelvic: External genitalia appear normal and consistent with mature female.  BUS normal                            Vagina is clean dry without discharge and appears adequately estrogenized, no lesions or masses are present                         Cervix is noninflamed without discharge or lesions.  There is no cervical motion tenderness.                Uterus is nonenlarged, without tenderness, and no masses or abnormalities are  present               Adnexa " are non-enlarged, non tender               Rectal exam reveals adequate sphincter tone and no masses or lesions are appreciated on digital rectal examination.      Annual Well Woman Exam  Patient Active Problem List   Diagnosis    Abdominal discomfort in right lower quadrant    Hypothyroidism (acquired)    Irregular menses    Infertility management    Intramural leiomyoma of uterus    Increased BMI                 Assessment & Plan   Diagnoses and all orders for this visit:    1. Encounter for gynecological examination (Primary)  -     IGP, Apt HPV,rfx 16 / 18,45    2. Breast cancer screening by mammogram  -     Mammo Screening Digital Tomosynthesis Bilateral With CAD; Future  -     Mammo Screening Digital Tomosynthesis Bilateral With CAD; Future  -     T4, Free  -     TSH  -     Follicle Stimulating Hormone    3. Screening for cervical cancer    4. Hypothyroidism (acquired)  -     Mammo Screening Digital Tomosynthesis Bilateral With CAD; Future  -     T4, Free  -     TSH  -     Follicle Stimulating Hormone    5. Increased BMI    6. Irregular menses  -     Mammo Screening Digital Tomosynthesis Bilateral With CAD; Future  -     T4, Free  -     TSH  -     Follicle Stimulating Hormone    7. Infertility management  Comments:  Recurrent SAB, normal HSG, productive endocrinology IVF x 2-SAB x 1  Amanda has had a normal hysterosalpingogram, history of recurrent SABs, has seen reproductive endocrinology with the reproductive [place].  She has had 2 rounds of IVF 1 of which resulted in a miscarriage the other 1 did not take.  They have been genetically pretested.  She is uncertain whether or not she wants to proceed with another round of IVF, we talked about a lot of the concerns associated with advanced maternal age including but not limited to higher miscarriage risk, increased risk of gestational hypertension preeclampsia and gestational diabetes.  Also discussed increased risk of  delivery, possible risk for  significant reduction in activities.  Amanda wants to think about those things and if she is interested will discuss with her reproductive endocrinologist early next year      In the meantime she started to have a little bit more irregular menses.  We are going to check some hormone levels and schedule an ultrasound as she is thought to have some small fibroids as well.  She does have a history of hypothyroidism and that is managed by her PCP option of observation or treatment of irregular cycles reviewed but would likely want to discuss with her reproductive endocrinologist if she wants to move towards another round of IVF     Discussed today's findings and concerns with patient.  Continue to recommend regular exercise including cardiovascular and resistance training as well as  breast self-exam. Wellness lab, mammography, & pap smear, in accordance with age guidelines.    I have encouraged her to call for today's test results if she has not received them within 10 days.  Patient is advised to call with any change in her condition or with any other questions, otherwise return  for annual examination.

## 2024-10-02 LAB
FSH SERPL-ACNC: 3.8 MIU/ML
T4 FREE SERPL-MCNC: 1.03 NG/DL (ref 0.92–1.68)
TSH SERPL DL<=0.005 MIU/L-ACNC: 2.52 UIU/ML (ref 0.27–4.2)

## 2024-10-06 LAB
CYTOLOGIST CVX/VAG CYTO: NORMAL
CYTOLOGY CVX/VAG DOC CYTO: NORMAL
CYTOLOGY CVX/VAG DOC THIN PREP: NORMAL
DX ICD CODE: NORMAL
HPV I/H RISK 4 DNA CVX QL PROBE+SIG AMP: NEGATIVE
Lab: NORMAL
OTHER STN SPEC: NORMAL
STAT OF ADQ CVX/VAG CYTO-IMP: NORMAL

## 2024-11-13 ENCOUNTER — TELEPHONE (OUTPATIENT)
Dept: OBSTETRICS AND GYNECOLOGY | Age: 39
End: 2024-11-13

## 2024-11-13 NOTE — TELEPHONE ENCOUNTER
Provider: DR. MONTES    Caller: Amanda Leonard    Relationship to Patient: Self    Pharmacy:     Phone Number: [phone]    Reason for Call: PT ADV SHE CANCELLED HER GYN U/S AND VISIT ON 11-18 DUE TO SHE IS BEING SEEN [facility]. THEIR OFFICE DID AN U/S AND SHE IS GOING TO DO A SALINE INFUSION SONOGRAM (WITHIN THE NEXT MONTH DUE TO IT'S BASED ON HER MENSES) WITH THEM. PT JUST WANTED DR. MONTES TO KNOW AND SEE WHAT OR IF HE NEEDS TO DO A PLAN OF CARE OR NEXT STEPS WITH DR. MONTES. IF DR. MONTES NEEDS RECORDS FOR THIS VISIT SHE DOES HAVE A PORTAL WITH THEM OR COULD GET THEM TO HIM SOME OTHER WAY IF NEEDED.    When was the patient last seen: 10-01-24